# Patient Record
Sex: FEMALE | Race: WHITE | NOT HISPANIC OR LATINO | Employment: OTHER | URBAN - METROPOLITAN AREA
[De-identification: names, ages, dates, MRNs, and addresses within clinical notes are randomized per-mention and may not be internally consistent; named-entity substitution may affect disease eponyms.]

---

## 2023-10-06 DIAGNOSIS — Z96.641 PRESENCE OF ARTIFICIAL HIP JOINT, RIGHT: Primary | ICD-10-CM

## 2023-10-06 PROBLEM — E78.5 HYPERLIPIDEMIA: Status: ACTIVE | Noted: 2023-10-06

## 2023-10-06 PROBLEM — L57.9 SKIN CHANGES DUE TO CHRONIC EXPOSURE TO NONIONIZING RADIATION, UNSPECIFIED: Status: ACTIVE | Noted: 2023-08-11

## 2023-10-06 PROBLEM — I78.1 NEVUS, NON-NEOPLASTIC: Status: ACTIVE | Noted: 2023-08-11

## 2023-10-06 PROBLEM — C88.0 WALDENSTROM MACROGLOBULINEMIA (MULTI): Status: ACTIVE | Noted: 2023-10-06

## 2023-10-06 PROBLEM — G43.909 MIGRAINES: Status: ACTIVE | Noted: 2023-10-06

## 2023-10-06 PROBLEM — B07.8 OTHER VIRAL WARTS: Status: ACTIVE | Noted: 2023-08-11

## 2023-10-06 PROBLEM — C85.10 B-CELL LYMPHOMA (MULTI): Status: ACTIVE | Noted: 2023-10-06

## 2023-10-06 PROBLEM — N94.9 VAGINAL BURNING: Status: ACTIVE | Noted: 2023-10-06

## 2023-10-06 PROBLEM — L81.4 OTHER MELANIN HYPERPIGMENTATION: Status: ACTIVE | Noted: 2023-08-11

## 2023-10-06 PROBLEM — C88.00 WALDENSTROM MACROGLOBULINEMIA: Status: ACTIVE | Noted: 2023-10-06

## 2023-10-06 PROBLEM — N94.89 VAGINAL BURNING: Status: ACTIVE | Noted: 2023-10-06

## 2023-10-06 PROBLEM — L82.1 OTHER SEBORRHEIC KERATOSIS: Status: ACTIVE | Noted: 2023-08-11

## 2023-10-06 PROBLEM — K13.0 DISEASES OF LIPS: Status: ACTIVE | Noted: 2023-08-11

## 2023-10-06 PROBLEM — D18.01 HEMANGIOMA OF SKIN AND SUBCUTANEOUS TISSUE: Status: ACTIVE | Noted: 2023-08-11

## 2023-10-06 PROBLEM — M67.442 MUCOUS CYST OF DIGIT OF LEFT HAND: Status: ACTIVE | Noted: 2023-10-06

## 2023-10-06 PROBLEM — D22.5 MELANOCYTIC NEVI OF TRUNK: Status: ACTIVE | Noted: 2023-08-11

## 2023-10-06 RX ORDER — NITROFURANTOIN 25; 75 MG/1; MG/1
CAPSULE ORAL
COMMUNITY
Start: 2022-10-29

## 2023-10-06 RX ORDER — DEXAMETHASONE 4 MG/1
TABLET ORAL
COMMUNITY
Start: 2019-01-02

## 2023-10-06 RX ORDER — ACYCLOVIR 400 MG/1
TABLET ORAL
COMMUNITY
Start: 2019-01-02

## 2023-10-06 RX ORDER — FLUOCINONIDE 0.5 MG/G
CREAM TOPICAL
COMMUNITY
Start: 2023-06-01

## 2023-10-06 RX ORDER — KETOCONAZOLE 20 MG/G
1 CREAM TOPICAL
COMMUNITY
Start: 2016-06-22

## 2023-10-06 RX ORDER — SCOLOPAMINE TRANSDERMAL SYSTEM 1 MG/1
PATCH, EXTENDED RELEASE TRANSDERMAL
COMMUNITY
Start: 2018-08-16

## 2023-10-06 RX ORDER — NYSTATIN 100000 [USP'U]/ML
SUSPENSION ORAL
COMMUNITY
Start: 2018-10-31

## 2023-10-06 RX ORDER — ESTRADIOL 0.1 MG/G
CREAM VAGINAL
COMMUNITY
Start: 2023-04-12

## 2023-10-06 RX ORDER — HYDROCORTISONE 25 MG/G
1 OINTMENT TOPICAL
COMMUNITY
Start: 2016-06-22

## 2023-10-06 RX ORDER — NYSTATIN AND TRIAMCINOLONE ACETONIDE 100000; 1 [USP'U]/G; MG/G
1 OINTMENT TOPICAL
COMMUNITY
Start: 2016-06-13

## 2023-10-06 RX ORDER — AZITHROMYCIN 250 MG/1
TABLET, FILM COATED ORAL
COMMUNITY
Start: 2018-08-16

## 2023-10-06 RX ORDER — CLORAZEPATE DIPOTASSIUM 7.5 MG/1
TABLET ORAL
COMMUNITY
Start: 2018-10-29

## 2023-10-06 RX ORDER — ROSUVASTATIN CALCIUM 10 MG/1
TABLET, COATED ORAL
COMMUNITY
Start: 2023-07-27

## 2023-10-06 RX ORDER — ZANUBRUTINIB 80 MG/1
CAPSULE, GELATIN COATED ORAL
COMMUNITY
Start: 2022-12-16

## 2023-10-06 RX ORDER — SUMATRIPTAN SUCCINATE 25 MG/1
TABLET ORAL
COMMUNITY
Start: 2018-10-29

## 2023-10-06 RX ORDER — PREDNISONE 10 MG/1
TABLET ORAL
COMMUNITY
Start: 2018-03-21

## 2023-10-06 RX ORDER — FLUTICASONE PROPIONATE 50 MCG
SPRAY, SUSPENSION (ML) NASAL
COMMUNITY
Start: 2018-06-19

## 2023-10-06 RX ORDER — AZELASTINE 1 MG/ML
SPRAY, METERED NASAL
COMMUNITY

## 2023-10-06 RX ORDER — CEPHALEXIN 500 MG/1
CAPSULE ORAL
COMMUNITY
Start: 2018-12-24

## 2023-10-09 ENCOUNTER — OFFICE VISIT (OUTPATIENT)
Dept: ORTHOPEDIC SURGERY | Facility: CLINIC | Age: 73
End: 2023-10-09
Payer: MEDICARE

## 2023-10-09 ENCOUNTER — ANCILLARY PROCEDURE (OUTPATIENT)
Dept: RADIOLOGY | Facility: CLINIC | Age: 73
End: 2023-10-09
Payer: MEDICARE

## 2023-10-09 VITALS — WEIGHT: 170 LBS | BODY MASS INDEX: 31.28 KG/M2 | HEIGHT: 62 IN

## 2023-10-09 DIAGNOSIS — Z96.641 PRESENCE OF ARTIFICIAL HIP JOINT, RIGHT: ICD-10-CM

## 2023-10-09 DIAGNOSIS — M70.61 TROCHANTERIC BURSITIS OF RIGHT HIP: Primary | ICD-10-CM

## 2023-10-09 PROCEDURE — 99203 OFFICE O/P NEW LOW 30 MIN: CPT | Performed by: ORTHOPAEDIC SURGERY

## 2023-10-09 PROCEDURE — 73502 X-RAY EXAM HIP UNI 2-3 VIEWS: CPT | Mod: RT

## 2023-10-09 PROCEDURE — 73502 X-RAY EXAM HIP UNI 2-3 VIEWS: CPT | Mod: RIGHT SIDE | Performed by: RADIOLOGY

## 2023-10-09 NOTE — PROGRESS NOTES
Subjective    Patient ID: Dominga Waters is a 73 y.o. female.    Chief Complaint: OTHER (F/U RT TERRY/DOS: 5/10)       This is a 73-year-old female who has not been seen in the office in 6 to 7 years and returns today with regard to follow-up of bilateral hip replacements the right having been done 12 years ago on the left 10 years ago.  With regard to both hips she has done extremely well and is dramatically improved compared with the pain and limitations she was experiencing prior to surgery.  On occasion she does note some tightness along the lateral aspect of her right hip.  She has not noted any fevers or chills and she is not experiencing any groin pain on either side.  She has remained ambulatory without use of an assistive device.    This patient's past medical, social, and family history were reviewed as well as a review of systems including updates on the patient's information encounter sheet  Denies history diabetes    Current Outpatient Medications:   ·  acyclovir (Zovirax) 400 mg tablet, Take by mouth., Disp: , Rfl:   ·  azelastine (Astelin) 137 mcg (0.1 %) nasal spray, , Disp: , Rfl:   ·  azithromycin (Zithromax) 250 mg tablet, Take by mouth., Disp: , Rfl:   ·  Brukinsa 80 mg capsule, , Disp: , Rfl:   ·  cephalexin (Keflex) 500 mg capsule, Take by mouth., Disp: , Rfl:   ·  clorazepate (Tranxene) 7.5 mg tablet, Take by mouth., Disp: , Rfl:   ·  dexAMETHasone (Decadron) 4 mg tablet, Take by mouth., Disp: , Rfl:   ·  estradiol (Estrace) 0.01 % (0.1 mg/gram) vaginal cream, Use as directed, Disp: , Rfl:   ·  fluocinonide 0.05 % cream, , Disp: , Rfl:   ·  fluticasone (Flonase) 50 mcg/actuation nasal spray, Administer into affected nostril(s)., Disp: , Rfl:   ·  hydrocortisone 2.5 % ointment, Apply 1 Application topically., Disp: , Rfl:   ·  ketoconazole (NIZOral) 2 % cream, Apply 1 Application topically., Disp: , Rfl:   ·  nitrofurantoin, macrocrystal-monohydrate, (Macrobid) 100 mg capsule, , Disp: ,  Rfl:   ·  nystatin (Mycostatin) 100,000 unit/mL suspension, Take by mouth., Disp: , Rfl:   ·  nystatin-triamcinolone (Mycolog II) ointment, Apply 1 Application topically., Disp: , Rfl:   ·  predniSONE (Deltasone) 10 mg tablet, Take by mouth., Disp: , Rfl:   ·  rosuvastatin (Crestor) 10 mg tablet, , Disp: , Rfl:   ·  scopolamine (Transderm-Scop) 1 mg over 3 days patch 3 day, Place on the skin., Disp: , Rfl:   ·  SUMAtriptan (Imitrex) 25 mg tablet, Take by mouth., Disp: , Rfl:       Physical Examination  Constitutional: Patient's height and weight reviewed, appears well kempt  Psychiatric: Alert and oriented ×3, appropriate mood and behavior  Pulmonary: Breathing appears nonlabored, no apparent distress  Lymphatic: No appreciable lymphadenopathy to both the upper and lower extremities  Skin: No open lesions, rashes, ulcerations  Neurologic: Gross motor and sensory exam appear intact (except for abnormalities noted in the below muscle skeletal exam)    Musculoskeletal: Satisfactory range of motion of each hip without groin or thigh pain elicited.  Ambulates weightbearing as tolerated without a limp without using assistive device.  There is no rotational deformity nor shortening of the lower extremity  There is some mild tenderness along the lateral aspect of the right hip without erythema or warmth.    X-rays performed and reviewed at length by myself demonstrate each hip replacement remains well-positioned without evidence of component loosening.    Assessment: Doing well status post hip replacement with perhaps some mild right hip trochanteric bursitis    Plan: Begin an outpatient physical therapy program for stretching.  May use topical Voltaren gel.  May consider a right hip injection in the future if so needed.            Encounter Diagnoses:  Trochanteric bursitis of right hip    Orders Placed This Encounter    Referral to Physical Therapy

## 2023-10-23 ENCOUNTER — EVALUATION (OUTPATIENT)
Dept: PHYSICAL THERAPY | Facility: CLINIC | Age: 73
End: 2023-10-23
Payer: MEDICARE

## 2023-10-23 DIAGNOSIS — M70.61 TROCHANTERIC BURSITIS OF RIGHT HIP: ICD-10-CM

## 2023-10-23 PROCEDURE — 97161 PT EVAL LOW COMPLEX 20 MIN: CPT | Mod: GP

## 2023-10-23 PROCEDURE — 97110 THERAPEUTIC EXERCISES: CPT | Mod: GP

## 2023-10-23 NOTE — PROGRESS NOTES
Physical Therapy Evaluation    Patient Name Dominga Waters  MRN: [id number]  Today's Date: 10/23/23  Time Calculation  Start Time: 0900  Stop Time: 0940  Time Calculation (min): 40 min    Assessment:  R hip bursitis limiting function with walking  Presents with  RLE weakness/ dec LE flexibility /balance deficits    Impression:  Skilled PT services will aid in advancing functional status and attaining therapy goals.    Problem List:  -activity limitations  -Functional limitations  -Pain R hip  -decreased strength   -decreased flexibility  -decreased knowledge of HEP  -balance    Goals:  STG 2 wks  Compliant with HEP  Dec pain 25%    LTG by discharge  I HEP  Improve functional outcome score to indicate improved functional mobility  Dec pain % on pain scale with ability to walk 30' w/o difficulty  Inc RLE strength with improved walking tolerance  Inc LE flexibility   Improve SLS > 15 sec w/o UE support      Rehab Potential: good    Plan:    Therapeutic exercise, Home program instruction and progression, Neuromuscular re-education, Therapeutic activities, Instruction in activity modification, Vasopneumatic device + cold, and Cryotherapy, balance activities    Nustep for soft tissue warmup, ROM/strengthening LE, LE flexibility, CKC activities, gait/stair training, CP/game ready       1 x week  until goals met or maximum rehab potential met  Pt is currently scheduled for 6 weeks    Plan of care was designed with input and agreement by the patient        Therapy Diagnoses:   1. Trochanteric bursitis of right hip  Referral to Physical Therapy    Follow Up In Physical Therapy          Onset Date: 9/1/23  Referring Physician: Lopresti  PT Orders: evaluate and treat    Insurance: MCR  -authorization required: no  -number of visits authorized: N/A  -Authorization/certification dates: 10/23/23-1/21/24    Next MD appointment: none      Subjective:    Current Episode of  "Functional Impairment and/or Pain :    6 wks ago insidious onset  Hx of B THR 10-12 yrs ago  Dx with bursitis  Feels like the post-op healing  Xrays looked good  Tried Voltaren gel with mild relief    Pain    Pain assessment 0-10  Pain score: 8  Pain location: lateral R hip tightness    Exacerbating Factors: walking  Relieving Factors: rest    Current Medical management:     PMHx: B THR, lymphoma in remission because \"numbers are low\"     Medications for pain: Voltaren gel     Diagnostic Tests: xrays-negative    Functional Limitations:  Functional Limitations: Walking  Limited with walking dog    Home Living Situation:  Lives with spouse  [place]anch  Laundry in basement    Prior Level of Function  Able to walk dog for 30'    Patient Goal For Therapy  Loosen R hip    Occupation: retired    Precautions: no fall risk  Objective:    Ortho:    Hip Eval    Objective:  AROM R hip flexion 117    Strength   LLE 5/5 except L hip flexion 4  RLE  knee ext: 4+          flex: 4+  hip flex: 4-        abd: 3+            flexibility:    hamstrings: WNL  TFL: WNL  Piriformis R end range tightness    SLS w/o UE support R 7 sec   L 15 sec     Special Tests    Hip scouring -  Fabres -    Palpation: POP lateral R hip    Gait: I amb w/o device  Step to pattern since THR          Outcome Measures:  Other Measures  Lower Extremity Funtional Score (LEFS): 41       Treatment:    LTR 10x  R piriformis stretch 10 sec 5x  Adductor sets 10x  Hooklying hip abduction green 10x   Isometric hip flexion 10x    Education: Educated on relevant anatomy and expected plan of care  Instructed in initial HEP including reasoning of given exercises and issued written instructions  V/c's required for correct exercise performance     "

## 2023-10-23 NOTE — LETTER
October 23, 2023     Patient: Dominga Waters   YOB: 1950   Date of Visit: 10/23/2023       To Whom It May Concern:    It is my medical opinion that Dominga Waters {Work release (duty restriction):80372}.    If you have any questions or concerns, please don't hesitate to call.         Sincerely,        Nayana Cotton, PT    CC: No Recipients

## 2023-10-23 NOTE — LETTER
October 23, 2023    Nayana Cotton, PT  [address]   Rehabilitation Services  [facility] [address]    Patient: Dominga Waters   YOB: 1950   Date of Visit: 10/23/2023       Dear Michael A Lopresti, Md  [address] [facility]  [place] [address]    The attached plan of care is being sent to you because your patient’s medical reimbursement requires that you certify the plan of care. Your signature is required to allow uninterrupted insurance coverage.      You may indicate your approval by signing below and faxing this form back to us at Dept Fax: [phone].    Please call Dept: [phone] with any questions or concerns.    Thank you for this referral,        Nayana Cotton, PT  [address] [facility] [address]  [address] [facility] [address]    Payer: Payor: MEDICARE / Plan: MEDICARE PART A AND B / Product Type: *No Product type* /                                                                         Date:     Dear Nayana Cotton PT,     Re: Ms. Dominga Waters, MRN:[id number]    I certify that I have reviewed the attached plan of care and it is medically necessary for Ms. Dominga Waters (1950) who is under my care.          ______________________________________                    _________________  Provider name and credentials                                           Date and time                                                                                           Plan of Care 10/23/23   Effective from: 10/23/2023  Effective to: 1/21/2024    Plan ID: [id number]            Participants as of Finalize on 10/23/2023    Name Type Comments Contact Info    Michael A Lopresti, MD Referring Provider  [phone]    Nayana Cotton PT Physical Therapist  [phone]       Last Plan Note     Author: Nayana Cotton PT Status: Incomplete Last edited: 10/23/2023  9:00 AM                                                        Physical Therapy Evaluation    Patient Name Dominga Waters  MRN: [id number]  Today's Date: 10/23/23  Time Calculation  Start Time: 0900  Stop Time: 0940  Time Calculation (min): 40 min    Assessment:  R hip bursitis limiting function with walking  Presents with  RLE weakness/ dec LE flexibility /balance deficits    Impression:  Skilled PT services will aid in advancing functional status and attaining therapy goals.    Problem List:  -activity limitations  -Functional limitations  -Pain R hip  -decreased strength   -decreased flexibility  -decreased knowledge of HEP  -balance    Goals:  STG 2 wks  Compliant with HEP  Dec pain 25%    LTG by discharge  I HEP  Improve functional outcome score to indicate improved functional mobility  Dec pain % on pain scale with ability to walk 30' w/o difficulty  Inc RLE strength with improved walking tolerance  Inc LE flexibility   Improve SLS > 15 sec w/o UE support      Rehab Potential: good    Plan:    Therapeutic exercise, Home program instruction and progression, Neuromuscular re-education, Therapeutic activities, Instruction in activity modification, Vasopneumatic device + cold, and Cryotherapy, balance activities    Nustep for soft tissue warmup, ROM/strengthening LE, LE flexibility, CKC activities, gait/stair training, CP/game ready       1 x week  until goals met or maximum rehab potential met  Pt is currently scheduled for 6 weeks    Plan of care was designed with input and agreement by the patient        Therapy Diagnoses:   1. Trochanteric bursitis of right hip  Referral to Physical Therapy    Follow Up In Physical Therapy          Onset Date: 9/1/23  Referring Physician: Lopresti  PT Orders: evaluate and treat    Insurance: MCR  -authorization required: no  -number of visits authorized: N/A  -Authorization/certification dates: 10/23/23-1/21/24    Next MD appointment: none      Subjective:    Current Episode of Functional Impairment and/or Pain :    6 wks  "ago insidious onset  Hx of B THR 10-12 yrs ago  Dx with bursitis  Feels like the post-op healing  Xrays looked good  Tried Voltaren gel with mild relief    Pain    Pain assessment 0-10  Pain score: 8  Pain location: lateral R hip tightness    Exacerbating Factors: walking  Relieving Factors: rest    Current Medical management:     PMHx: B THR, lymphoma in remission because \"numbers are low\"     Medications for pain: Voltaren gel     Diagnostic Tests: xrays-negative    Functional Limitations:  Functional Limitations: Walking  Limited with walking dog    Home Living Situation:  Lives with spouse  [place]anch  Laundry in basement    Prior Level of Function  Able to walk dog for 30'    Patient Goal For Therapy  Loosen R hip    Occupation: retired    Precautions: no fall risk  Objective:    Ortho:    Hip Eval    Objective:  AROM R hip flexion 117    Strength   LLE 5/5 except L hip flexion 4  RLE  knee ext: 4+          flex: 4+  hip flex: 4-        abd: 3+            flexibility:    hamstrings: WNL  TFL: WNL  Piriformis R end range tightness    SLS w/o UE support R 7 sec   L 15 sec     Special Tests    Hip scouring -  Fabres -    Palpation: POP lateral R hip    Gait: I amb w/o device  Step to pattern since THR          Outcome Measures:  Other Measures  Lower Extremity Funtional Score (LEFS): 41       Treatment:    LTR 10x  R piriformis stretch 10 sec 5x  Adductor sets 10x  Hooklying hip abduction green 10x   Isometric hip flexion 10x    Education: Educated on relevant anatomy and expected plan of care  Instructed in initial HEP including reasoning of given exercises and issued written instructions  V/c's required for correct exercise performance            Current Participants as of 10/23/2023    Name Type Comments Contact Info    Michael A Lopresti, MD Referring Provider  [phone]    Signature pending    Nayana Cotton PT Physical Therapist  [phone]    Signature pending      "

## 2023-10-23 NOTE — LETTER
October 23, 2023     Patient: Dominga Waters   YOB: 1950   Date of Visit: 10/23/2023       To Whom it May Concern:    Dominga Waters was seen in my clinic on 10/23/2023. She {Return to school/sport:15876}.    If you have any questions or concerns, please don't hesitate to call.         Sincerely,          Nayana Cotton, PT        CC: No Recipients

## 2023-10-30 ENCOUNTER — TREATMENT (OUTPATIENT)
Dept: PHYSICAL THERAPY | Facility: CLINIC | Age: 73
End: 2023-10-30
Payer: MEDICARE

## 2023-10-30 DIAGNOSIS — M70.61 TROCHANTERIC BURSITIS OF RIGHT HIP: ICD-10-CM

## 2023-10-30 PROCEDURE — 97112 NEUROMUSCULAR REEDUCATION: CPT | Mod: GP

## 2023-10-30 PROCEDURE — 97110 THERAPEUTIC EXERCISES: CPT | Mod: GP

## 2023-10-30 NOTE — PROGRESS NOTES
Physical Therapy Treatment Note      Patient Name Dominga Waters  MRN: [id number]  Today's Date: 10/30/23  Time Calculation  Start Time: 0945  Stop Time: 1025  Time Calculation (min): 40 min    Insurance: [facility]  Visit #: 2  Date of Onset:  -authorization required: no  -number of visits authorized N/A  -authorization/ certification dates: 10/23/23-1/21/24    General:  Next MD appt: none    Therapy Diagnoses:   1. Trochanteric bursitis of right hip  Follow Up In Physical Therapy          Assessment:  skilled intervention: exercise progression for balance    Patient would continue to benefit from skilled PT to address remaining functional, objective and subjective deficits to allow them to return to full independence with ADLs   Did well with initial HEP although does require some cueing  Progressed well with CKC/balance activities with no inc sx    Plan:  SLR  Sidelying hip abduction    Precautions: no fall risk    Subjective:  General:  Using Voltaren gel with relief  Did well with HEP although did wake up with some inc stiffness on Saturday that has resolved    Functional Progress:  Walking dog 1/2 block  Reports she misunderstood last visit and she is able to do reciprocal stairs with HR    Pain  Pain assessment 0-10  Pain score: 0  Pain location: tightness only R hip      Compliant with HEP:  yes    Understanding of HEP: needs review    Objective:      Therapeutic Exercise    25 minutes    see below  **indicates new exercises or progression  NP=not performed    Neuromuscular Re-education:    15 minutes    See below  **indicates new exercises or progression  NP=not performed    Therapeutic Activity:      Manual      minutes    Modalities    Electric Stimulation    minutes    Ultrasound    minutes    Vasopneumatic Device    minutes      Gait      minutes    Other     Exercise Log:    Nustep L3 5' **  LTR 10x  [facility]  [place]x **  R piriformis stretch 10 sec 5x  Adductor sets 10x  Hooklying hip abduction green 10x   Hooklying marching green 10x **  Isometric hip flexion 10x  Bridge 10x **  Seated hamstring curl green 10x2 **    SLS 10 sec 5x airex BLE **  Airex calf raises 10x2 **  Side stepping 3x  yellow **  Standing hip abd/ext 2# 10x2 **    Education:    Instructed in progression of exercises and issued written instructions for bridge and marching  Some v/c's required to complete exercises

## 2023-11-07 ENCOUNTER — TREATMENT (OUTPATIENT)
Dept: PHYSICAL THERAPY | Facility: CLINIC | Age: 73
End: 2023-11-07
Payer: MEDICARE

## 2023-11-07 DIAGNOSIS — M70.61 TROCHANTERIC BURSITIS OF RIGHT HIP: ICD-10-CM

## 2023-11-07 PROCEDURE — 97110 THERAPEUTIC EXERCISES: CPT | Mod: GP,CQ

## 2023-11-07 PROCEDURE — 97112 NEUROMUSCULAR REEDUCATION: CPT | Mod: GP,CQ

## 2023-11-07 NOTE — PROGRESS NOTES
Physical Therapy Treatment Note      Patient Name Dominga Waters  MRN: [id number]  Today's Date: 11/07/23    Time Calculation  Start Time: 0830  Stop Time: 0915  Time Calculation (min): 45 min    Insurance: [facility]  Visit #: 3  Date of Onset: 9/1/23  -authorization required: no  -number of visits authorized N/A  -authorization/ certification dates: 10/23/23-1/21/24    General:  Next MD appt: none    Therapy Diagnoses:   1. Trochanteric bursitis of right hip  Follow Up In Physical Therapy          Assessment:  Patient presented to session with less soft tissue tightness in the R hip than previous sessions. Able to add SLR and side lying hip abduction with tactile cues given for pelvic stabilization. Challenged with balance strategies using unilateral support during SLS on Air Ex. No c/o increased pain post treatment. Updated HEP.    Plan:  Add side lying clamshells and resisted monster walk.    Precautions: no fall risk    Subjective:  Patient states her R hip does not feel as tight as it used to; says she uses Voltaren 2x a day.    Functional Progress:  Walking her dog and doing steps without difficulty    Pain  Pain assessment 0-10  Pain score: 0  Pain location: tightness only R hip      Compliant with HEP:  yes    Understanding of HEP: needs review    Objective:      Therapeutic Exercise    30 minutes    see below  **indicates new exercises or progression  NP=not performed    Neuromuscular Re-education:    15 minutes    See below  **indicates new exercises or progression  NP=not performed    Therapeutic Activity:      Manual      minutes    Modalities    Electric Stimulation    minutes    Ultrasound    minutes    Vasopneumatic Device    minutes      Gait      minutes    Other     Exercise Log:    Nustep L3 x 5'  LTR 10x  SKC 10x   R piriformis stretch 10 sec 5x  Adductor sets 10x  Hooklying hip abduction green 10x    Hooklying marching green 10x   Isometric hip flexion 10x  Bridge 10x   Side lying hip abd x 10 **  SLR x 10 **  Seated hamstring curl green 10x2     SLS 10 sec 5x airex BLE unilateral support  Airex calf raises 10x2   Side stepping 3x  yellow   Standing hip abd/ext 2# 10x2     Education:  Exercise technique, postural awareness, exercise progression

## 2023-11-14 ENCOUNTER — TREATMENT (OUTPATIENT)
Dept: PHYSICAL THERAPY | Facility: CLINIC | Age: 73
End: 2023-11-14
Payer: MEDICARE

## 2023-11-14 DIAGNOSIS — M70.61 TROCHANTERIC BURSITIS OF RIGHT HIP: ICD-10-CM

## 2023-11-14 PROCEDURE — 97112 NEUROMUSCULAR REEDUCATION: CPT | Mod: GP,CQ

## 2023-11-14 PROCEDURE — 97110 THERAPEUTIC EXERCISES: CPT | Mod: GP,CQ

## 2023-11-14 NOTE — PROGRESS NOTES
Physical Therapy Treatment Note      Patient Name Dominga Waters  MRN: [id number]  Today's Date: 11/14/23    Time Calculation  Start Time: 0915  Stop Time: 1000  Time Calculation (min): 45 min    Insurance: [facility]  Visit #: 4  Date of Onset: 9/1/23  -authorization required: no  -number of visits authorized N/A  -authorization/ certification dates: 10/23/23-1/21/24    General:  Next MD appt: none    Therapy Diagnoses:   1. Trochanteric bursitis of right hip  Follow Up In Physical Therapy          Assessment:  Patient continues to have decreasing tightness of the R hip since beginning PT. Able to increase repetitions for resisted hip abduction and marching in supine without difficulty. Progressed with resisted monster walks forward and retro with verbal cues for proper stepping technique. Tactile cues given for pelvic stability during side lying clamshells and side lying hip abduction. No c/o increased pain post session.    Plan:  Increase ankle weight for standing hip abd/ext and add shuttle leg press NV    Precautions: no fall risk    Subjective:  Patient states the R hip does not feel as tight as in the beginning of PT.    Functional Progress:  Less pain and tightness with ADLs and iADLs    Pain  Pain assessment 0-10  Pain score: 0  Pain location: tightness only R hip      Compliant with HEP:  yes    Understanding of HEP: needs review    Objective:      Therapeutic Exercise    30 minutes    see below  **indicates new exercises or progression  NP=not performed    Neuromuscular Re-education:    15 minutes    See below  **indicates new exercises or progression  NP=not performed    Therapeutic Activity:      Manual      minutes    Modalities    Electric Stimulation    minutes    Ultrasound    minutes    Vasopneumatic Device    minutes      Gait      minutes    Other     Exercise Log:    Nustep L3 x 5'  LTR 10x  [facility] 10x    R piriformis stretch 10 sec 5x  Adductor sets 10x  Hooklying hip abduction green 10x2**  Hooklying marching green 10x2**   Isometric hip flexion 10x  Bridge 10x   Side lying hip abd x 10   Side lying clamshells x 10 **  SLR x 10   Seated hamstring curl green 10x2     SLS 10 sec 5x airex BLE unilateral support  Airex calf raises 10x2   Side stepping 3x  yellow   Monster walk fwd/retro 2x yellow **  Standing hip abd/ext 2# 10x2     Education:  Exercise technique, postural awareness, exercise progression

## 2023-11-21 ENCOUNTER — TREATMENT (OUTPATIENT)
Dept: PHYSICAL THERAPY | Facility: CLINIC | Age: 73
End: 2023-11-21
Payer: MEDICARE

## 2023-11-21 DIAGNOSIS — M70.61 TROCHANTERIC BURSITIS OF RIGHT HIP: ICD-10-CM

## 2023-11-21 PROCEDURE — 97112 NEUROMUSCULAR REEDUCATION: CPT | Mod: GP

## 2023-11-21 PROCEDURE — 97110 THERAPEUTIC EXERCISES: CPT | Mod: GP

## 2023-11-21 NOTE — PROGRESS NOTES
Physical Therapy Treatment Note      Patient Name Dominga Waters  MRN: [id number]  Today's Date: 11/21/23    Time Calculation  Start Time: 0910  Stop Time: 0950  Time Calculation (min): 40 min    Insurance: Payor: MEDICARE / Plan: MEDICARE PART A AND B / Product Type: *No Product type* /   Visit #: 5  Date of Onset: 9/1/23  -authorization required: no  -number of visits authorized N/A  -authorization/ certification dates: 10/23/23-1/21/24    General:  Next MD appt: none    Therapy Diagnoses:   1. Trochanteric bursitis of right hip  Follow Up In Physical Therapy    Follow Up In Physical Therapy          Assessment:  skilled intervention: exercise progression for strength, education for exercise performance    Patient would continue to benefit from skilled PT to address remaining functional, objective and subjective deficits to allow them to return to full independence with ADLs   Tolerated progression of resistance well    STG's met    Progressing well with dec pain and good progression with resistive exercises however could benefit from 2 additional visits to further inc strength for improved function  Good core stabilization noted with bridge     Plan:  Add band to clamshells  Airex balance beam  Add 2 visits to current schedule    Precautions: no fall risk    Subjective:  General:  Reporting tightness  40-50% dec pain    Functional Progress:  Back pain with putting up louie decorations over the weekend where she did a lot of bending  Walking tolerance improving    Pain  Pain assessment 0-10  Pain score: 3  Pain location: R hip    Compliant with HEP:  yes    Understanding of HEP: WNL    Objective:  Therapeutic Exercise    25 minutes  see below  **indicates new exercises or progression  NP=not performed    Neuromuscular Re-education:    15 minutes  See below  **indicates new exercises or progression  NP=not performed    Manual       minutes    Modalities    Electric Stimulation    minutes    Ultrasound    minutes    Other   No POP at lateral R hip    Exercise Log:  Nustep L3 x 5'  LTR 10x  SKC 10x   R piriformis stretch 10 sec 5x  Adductor sets 10x  Hooklying hip abduction green 10x2  Hooklying marching green 10x2  Isometric hip flexion 10x  Bridge 10x   Side lying hip abd  10x2 1# **  Side lying clamshells 10x2  SLR 10x2   1# **  Seated hamstring curl green 10x2      SLS 10 sec 5x airex BLE unilateral support  Airex calf raises 10x2   Side stepping 3x  yellow   Monster walk fwd/retro 2x yellow   Standing hip abd/ext 3# 10x2 **  Shuttle 25# 10x2 **    Education:  Instructed in progression of exercises  V/c's for correct exercise performance

## 2023-11-27 ENCOUNTER — APPOINTMENT (OUTPATIENT)
Dept: PHYSICAL THERAPY | Facility: CLINIC | Age: 73
End: 2023-11-27
Payer: MEDICARE

## 2023-11-29 ENCOUNTER — TREATMENT (OUTPATIENT)
Dept: PHYSICAL THERAPY | Facility: CLINIC | Age: 73
End: 2023-11-29
Payer: MEDICARE

## 2023-11-29 DIAGNOSIS — M70.61 TROCHANTERIC BURSITIS OF RIGHT HIP: ICD-10-CM

## 2023-11-29 PROCEDURE — 97110 THERAPEUTIC EXERCISES: CPT | Mod: GP

## 2023-11-29 PROCEDURE — 97112 NEUROMUSCULAR REEDUCATION: CPT | Mod: GP

## 2023-11-29 NOTE — PROGRESS NOTES
Physical Therapy Treatment Note      Patient Name Dominga Waters  MRN: [id number]  Today's Date: 11/29/23    Time Calculation  Start Time: 0945  Stop Time: 1025  Time Calculation (min): 40 min    Insurance: Payor: MEDICARE / Plan: MEDICARE PART A AND B / Product Type: *No Product type* /   Visit #: 6  Date of Onset: 9/1/23  -authorization required: no  -number of visits authorized N/A  -authorization/ certification dates:10/23/23-1/21/24     General:  Next MD appt: none    Therapy Diagnoses:   1. Trochanteric bursitis of right hip  Follow Up In Physical Therapy          Assessment:  skilled intervention:  Exercise progression for strength    Patient would continue to benefit from skilled PT to address remaining functional, objective and subjective deficits to allow them to return to full independence with ADLs     Good tolerance for progression of strengthening  Minimal c/o pain in the last week    Plan:  Inc shuttle  Inc monster walks/side stepping    Precautions: no fall risk    Subjective:  General:  Arrives with no pain  Intermittent tightness only  Feeling stronger    Functional Progress:  Cont to improve with walking tolerance    Pain  Pain assessment 0-10  Pain score: 0  Pain location: R hip    Compliant with HEP:  yes    Understanding of HEP: WNL    Objective:  Therapeutic Exercise    25 minutes  see below  **indicates new exercises or progression  NP=not performed    Neuromuscular Re-education:    15 minutes  See below  **indicates new exercises or progression  NP=not performed      Manual      minutes    Modalities    Electric Stimulation    minutes    Ultrasound    minutes    Vasopneumatic Device    minutes      Gait      minutes    Other   AROM R hip flexion 120    Exercise Log:  Nustep L3 x 5'  LTR 10x  SKC 10x   R piriformis stretch 10 sec 5x  Adductor sets 10x  Hooklying hip abduction green 10x2  Hooklying marching green  10x2  Isometric hip flexion 10x  Bridge 10x   Side lying hip abd  10x2 1#   Side lying clamshells 10x2 green **  SLR 10x2   1#   Seated hamstring curl green 10x2   LAQ 2# 10X2 **     SLS 10 sec 5x airex BLE unilateral support  Airex calf raises 10x2   Side stepping 3x  yellow   Monster walk fwd/retro 2x yellow   Standing hip abd/ext 3# 10x2   Shuttle 25# 10x2   Airex balance beam side stepping 5x **    Education:  Instructed in progression of exercises  V/c's for exercise performance

## 2023-12-07 ENCOUNTER — TREATMENT (OUTPATIENT)
Dept: PHYSICAL THERAPY | Facility: CLINIC | Age: 73
End: 2023-12-07
Payer: MEDICARE

## 2023-12-07 DIAGNOSIS — M70.61 TROCHANTERIC BURSITIS OF RIGHT HIP: ICD-10-CM

## 2023-12-07 PROCEDURE — 97112 NEUROMUSCULAR REEDUCATION: CPT | Mod: GP

## 2023-12-07 PROCEDURE — 97110 THERAPEUTIC EXERCISES: CPT | Mod: GP

## 2023-12-07 NOTE — PROGRESS NOTES
Physical Therapy Treatment Note      Patient Name Dominga Waters  MRN: [id number]  Today's Date: 12/07/23  Time Calculation  Start Time: 1040  Stop Time: 1120  Time Calculation (min): 40 min    Insurance: Payor: MEDICARE / Plan: MEDICARE PART A AND B / Product Type: *No Product type* /   Visit #: 7  Date of Onset:  9/1/23   -authorization required: no  -number of visits authorized N/A  -authorization/ certification dates:10/23/23-1/21/24      General:  Next MD appt: none    Therapy Diagnoses:   1. Trochanteric bursitis of right hip  Follow Up In Physical Therapy          Assessment:  skilled intervention: exercise progression for strength    Patient would continue to benefit from skilled PT to address remaining functional, objective and subjective deficits to allow them to return to full independence with ADLs     Progressed with resistance without difficulty and maintaining low to no pain level    Plan:  1 more visit then discharge to HEP    Precautions: no   fall risk    Subjective:  General:  Hardly noticing pain currently    Functional Progress:  No falls  Tolerating walking well- can walk 30' outside    Pain  Pain assessment 0-10  Pain score: 0  Pain location: R hip    Compliant with HEP:  yes    Understanding of HEP: WNL    Objective:  Therapeutic Exercise  25 minutes  see below  **indicates new exercises or progression  NP=not performed    Neuromuscular Re-education:  15 minutes  See below  **indicates new exercises or progression  NP=not performed    Other     Exercise Log:  Nustep L3 x 5'  LTR 10x  SKC 10x   R piriformis stretch 10 sec 5x  Adductor sets 10x  Hooklying hip abduction green 10x2  Hooklying marching green 10x2  Isometric hip flexion 10x  Bridge 10x   Side lying hip abd  10x2 1#   Side lying clamshells 10x2 green   SLR 10x2   1#   Seated hamstring curl green 10x2   LAQ 2# 10X2      SLS 10 sec 5x airex BLE unilateral  support  Airex calf raises 10x2   Side stepping 3x  red **  Monster walk fwd/retro 2x red **  Standing hip abd/ext 3# 10x2   Shuttle 31# 10x2 **  Shuttle RLE 25# 10x2 **  Airex balance beam side stepping 5x      Education:  Instructed in progression of exercises

## 2023-12-13 ENCOUNTER — TREATMENT (OUTPATIENT)
Dept: PHYSICAL THERAPY | Facility: CLINIC | Age: 73
End: 2023-12-13
Payer: MEDICARE

## 2023-12-13 DIAGNOSIS — M70.61 TROCHANTERIC BURSITIS OF RIGHT HIP: Primary | ICD-10-CM

## 2023-12-13 PROCEDURE — 97110 THERAPEUTIC EXERCISES: CPT | Mod: GP

## 2023-12-13 PROCEDURE — 97112 NEUROMUSCULAR REEDUCATION: CPT | Mod: GP

## 2023-12-13 NOTE — PROGRESS NOTES
Physical Therapy Treatment Note      Patient Name Dominga Waters  MRN: [id number]  Today's Date: 12/13/23  Time Calculation  Start Time: 0815  Stop Time: 0855  Time Calculation (min): 40 min    Insurance: Payor: MEDICARE / Plan: MEDICARE PART A AND B / Product Type: *No Product type* /   Visit #: 8  Date of Onset:9/1/23    -authorization required: no  -number of visits authorized N/A  -authorization/ certification dates:10/23/23-1/21/24     General:  Next MD appt: none    Therapy Diagnoses:   1. Trochanteric bursitis of right hip  Follow Up In Physical Therapy          Assessment:  skilled intervention: reasmt, education for HEP    LTG   I HEP (met)  Improve functional outcome score to indicate improved functional mobility (met)  Dec pain % on pain scale with ability to walk 30' w/o difficulty (met)  Inc RLE strength with improved walking tolerance (met)  Inc LE flexibility (met)  Improve SLS > 15 sec w/o UE support (met)    Plan:  Discharge to [facility] for report period 10/23/23 -12/13/23    Precautions: no fall risk    Subjective:  General:  Arrives with no pain    Functional Progress:  No falls   Reports improved walking tolerance    Pain  Pain assessment 0-10  Pain score:  Pain location: R hip    Compliant with HEP:  yes    Understanding of HEP: WNL    Objective:  Therapeutic Exercise  25 minutes  see below  **indicates new exercises or progression  NP=not performed    Neuromuscular Re-education:  15 minutes  See below  **indicates new exercises or progression  NP=not performed    Other   RLE  knee ext: 5          flex: 5  hip flex: 4+        abd: 4          flexibility:  hamstrings: WNL  TFL: WNL  Piriformis R WNL     SLS w/o UE support R 15 sec   L 15 sec     Exercise Log:  Nustep L3 x 5'  LTR 10x  SKC 10x   R piriformis stretch 10 sec 5x  Adductor sets 10x  Hooklying hip abduction green 10x2  Hooklying marching green 10x2  Isometric hip  flexion 10x  Bridge 10x   Side lying hip abd  10x2 1#   Side lying clamshells 10x2 green   SLR 10x2   1#   Seated hamstring curl green 10x2   LAQ 2# 10X2      SLS 10 sec 5x airex BLE unilateral support  Airex calf raises 10x2   Side stepping 3x  red   Monster walk fwd/retro 2x red   Standing hip abd/ext 3# 10x2   Shuttle 31# 10x2   Shuttle RLE 25# 10x2   Airex balance beam side stepping 5x      Education:  Review of HEP and progression    Outcome Measures:  Other Measures  Lower Extremity Funtional Score (LEFS): 60

## 2024-04-17 DIAGNOSIS — M25.561 ACUTE PAIN OF RIGHT KNEE: ICD-10-CM

## 2024-04-18 ENCOUNTER — OFFICE VISIT (OUTPATIENT)
Dept: ORTHOPEDIC SURGERY | Facility: CLINIC | Age: 74
End: 2024-04-18
Payer: MEDICARE

## 2024-04-18 ENCOUNTER — HOSPITAL ENCOUNTER (OUTPATIENT)
Dept: RADIOLOGY | Facility: CLINIC | Age: 74
Discharge: HOME | End: 2024-04-18
Payer: MEDICARE

## 2024-04-18 VITALS — BODY MASS INDEX: 30.12 KG/M2 | WEIGHT: 170 LBS | HEIGHT: 63 IN

## 2024-04-18 DIAGNOSIS — G89.29 CHRONIC PAIN OF RIGHT KNEE: ICD-10-CM

## 2024-04-18 DIAGNOSIS — M17.11 ARTHRITIS OF RIGHT KNEE: Primary | ICD-10-CM

## 2024-04-18 DIAGNOSIS — M25.561 ACUTE PAIN OF RIGHT KNEE: ICD-10-CM

## 2024-04-18 DIAGNOSIS — M25.561 CHRONIC PAIN OF RIGHT KNEE: ICD-10-CM

## 2024-04-18 PROCEDURE — 1036F TOBACCO NON-USER: CPT | Performed by: ORTHOPAEDIC SURGERY

## 2024-04-18 PROCEDURE — 73562 X-RAY EXAM OF KNEE 3: CPT | Mod: RT

## 2024-04-18 PROCEDURE — 20610 DRAIN/INJ JOINT/BURSA W/O US: CPT | Performed by: ORTHOPAEDIC SURGERY

## 2024-04-18 PROCEDURE — 99214 OFFICE O/P EST MOD 30 MIN: CPT | Performed by: ORTHOPAEDIC SURGERY

## 2024-04-18 PROCEDURE — 1159F MED LIST DOCD IN RCRD: CPT | Performed by: ORTHOPAEDIC SURGERY

## 2024-04-18 PROCEDURE — 1160F RVW MEDS BY RX/DR IN RCRD: CPT | Performed by: ORTHOPAEDIC SURGERY

## 2024-04-18 RX ORDER — CETIRIZINE HYDROCHLORIDE 10 MG/1
10 TABLET ORAL
COMMUNITY

## 2024-04-18 RX ORDER — TRIAMCINOLONE ACETONIDE 40 MG/ML
40 INJECTION, SUSPENSION INTRA-ARTICULAR; INTRAMUSCULAR
Status: COMPLETED | OUTPATIENT
Start: 2024-04-18 | End: 2024-04-18

## 2024-04-18 RX ORDER — LIDOCAINE HYDROCHLORIDE 10 MG/ML
2 INJECTION INFILTRATION; PERINEURAL
Status: COMPLETED | OUTPATIENT
Start: 2024-04-18 | End: 2024-04-18

## 2024-04-18 RX ADMIN — TRIAMCINOLONE ACETONIDE 40 MG: 40 INJECTION, SUSPENSION INTRA-ARTICULAR; INTRAMUSCULAR at 09:51

## 2024-04-18 RX ADMIN — LIDOCAINE HYDROCHLORIDE 2 ML: 10 INJECTION INFILTRATION; PERINEURAL at 09:51

## 2024-04-18 ASSESSMENT — ENCOUNTER SYMPTOMS: KNEE SWELLING: 1

## 2024-04-18 NOTE — PROGRESS NOTES
Subjective    Patient ID: Dominga Waters is a 74 y.o. female.    Chief Complaint: Follow-up of the Right Knee       This is a 74-year-old female who presents for evaluation of right knee pain which she states has been ongoing for few years.  There is no specific injury.  She has noted occasional cracking and popping sensation.  Discomfort with standing and walking.  Has tried rest, modifications of activities as well as ibuprofen with limited benefit.  Majority the pain she describes along the anterior and medial aspect of the knee.  Has had previous right hip replacement done many years ago for which she has done well.  Also has some degree of chronic low back pain.    This patient's past medical, social, and family history were reviewed as well as a review of systems including updates on the patient's information encounter sheet  Patient is not diabetic    Physical Examination  Constitutional: Patient's height and weight reviewed, appears well kempt  Psychiatric: Alert and oriented ×3, appropriate mood and behavior  Pulmonary: Breathing appears nonlabored, no apparent distress  Lymphatic: No appreciable lymphadenopathy to both the upper and lower extremities  Skin: No open lesions, rashes, ulcerations  Neurologic: Gross motor and sensory exam appear intact (except for abnormalities noted in the below muscle skeletal exam)    Musculoskeletal: Satisfactory range of motion of the right hip without groin or thigh pain elicited.  The right knee reveals a small effusion.  Full active extension.  Flexion limited to 115 degrees.  Patellofemoral crepitus with range of motion.  Localized medial joint line tenderness.  Increasing pain with Dionicio examination.  No gross ligamentous instability.    X-rays performed today reviewed at length with the patient demonstrate evidence of just mild arthritic changes involving the patellofemoral joint as well as the medial joint space is visualized on the tunnel  view.    Assessment: Mild arthritis right knee    Plan: An extended discussion ensued with the patient regarding the treatment options for their knee condition. This included both nonoperative and operative treatment options.. The patient will continue with modifications of activities of daily living as well as an exercise program. As the patient desired an intra-articular knee injection of Kenalog/lidocaine was performed today and tolerated well.. The patient will observe to see if the injection is of benefit. Follow-up on a when necessary basis.    Right Knee     L Inj/Asp: R knee on 4/18/2024 9:51 AM  Indications: pain  Details: 22 G needle, anterolateral approach  Medications: 40 mg triamcinolone acetonide 40 mg/mL; 2 mL lidocaine 10 mg/mL (1 %)  Consent was given by the patient. Patient was prepped and draped in the usual sterile fashion.               Current Outpatient Medications:     Brukinsa 80 mg capsule, , Disp: , Rfl:     rosuvastatin (Crestor) 10 mg tablet, , Disp: , Rfl:     acyclovir (Zovirax) 400 mg tablet, Take by mouth., Disp: , Rfl:     azelastine (Astelin) 137 mcg (0.1 %) nasal spray, , Disp: , Rfl:     azithromycin (Zithromax) 250 mg tablet, Take by mouth., Disp: , Rfl:     cephalexin (Keflex) 500 mg capsule, Take by mouth., Disp: , Rfl:     cetirizine (ZyrTEC) 10 mg tablet, Take 1 tablet (10 mg) by mouth., Disp: , Rfl:     clorazepate (Tranxene) 7.5 mg tablet, Take by mouth., Disp: , Rfl:     dexAMETHasone (Decadron) 4 mg tablet, Take by mouth., Disp: , Rfl:     estradiol (Estrace) 0.01 % (0.1 mg/gram) vaginal cream, Use as directed, Disp: , Rfl:     fluocinonide 0.05 % cream, , Disp: , Rfl:     fluticasone (Flonase) 50 mcg/actuation nasal spray, Administer into affected nostril(s)., Disp: , Rfl:     hydrocortisone 2.5 % ointment, Apply 1 Application topically., Disp: , Rfl:     ketoconazole (NIZOral) 2 % cream, Apply 1 Application topically., Disp: , Rfl:     nitrofurantoin,  macrocrystal-monohydrate, (Macrobid) 100 mg capsule, , Disp: , Rfl:     nystatin (Mycostatin) 100,000 unit/mL suspension, Take by mouth., Disp: , Rfl:     nystatin-triamcinolone (Mycolog II) ointment, Apply 1 Application topically., Disp: , Rfl:     predniSONE (Deltasone) 10 mg tablet, Take by mouth., Disp: , Rfl:     scopolamine (Transderm-Scop) 1 mg over 3 days patch 3 day, Place on the skin., Disp: , Rfl:     SUMAtriptan (Imitrex) 25 mg tablet, Take by mouth., Disp: , Rfl:

## 2024-08-09 ENCOUNTER — APPOINTMENT (OUTPATIENT)
Dept: DERMATOLOGY | Facility: CLINIC | Age: 74
End: 2024-08-09
Payer: MEDICARE

## 2024-08-09 DIAGNOSIS — L82.1 SEBORRHEIC KERATOSIS: ICD-10-CM

## 2024-08-09 DIAGNOSIS — D22.9 MULTIPLE BENIGN NEVI: Primary | ICD-10-CM

## 2024-08-09 PROCEDURE — 99213 OFFICE O/P EST LOW 20 MIN: CPT | Performed by: STUDENT IN AN ORGANIZED HEALTH CARE EDUCATION/TRAINING PROGRAM

## 2024-08-09 PROCEDURE — 1159F MED LIST DOCD IN RCRD: CPT | Performed by: STUDENT IN AN ORGANIZED HEALTH CARE EDUCATION/TRAINING PROGRAM

## 2024-08-09 ASSESSMENT — DERMATOLOGY QUALITY OF LIFE (QOL) ASSESSMENT
ARE THERE EXCLUSIONS OR EXCEPTIONS FOR THE QUALITY OF LIFE ASSESSMENT: NO
RATE HOW EMOTIONALLY BOTHERED YOU ARE BY YOUR SKIN PROBLEM (FOR EXAMPLE, WORRY, EMBARRASSMENT, FRUSTRATION): 0 - NEVER BOTHERED
RATE HOW BOTHERED YOU ARE BY SYMPTOMS OF YOUR SKIN PROBLEM (EG, ITCHING, STINGING BURNING, HURTING OR SKIN IRRITATION): 0 - NEVER BOTHERED
RATE HOW BOTHERED YOU ARE BY EFFECTS OF YOUR SKIN PROBLEMS ON YOUR ACTIVITIES (EG, GOING OUT, ACCOMPLISHING WHAT YOU WANT, WORK ACTIVITIES OR YOUR RELATIONSHIPS WITH OTHERS): 0 - NEVER BOTHERED
DATE THE QUALITY-OF-LIFE ASSESSMENT WAS COMPLETED: 67061

## 2024-08-09 ASSESSMENT — DERMATOLOGY PATIENT ASSESSMENT: DO YOU HAVE ANY NEW OR CHANGING LESIONS: YES

## 2024-08-09 ASSESSMENT — ITCH NUMERIC RATING SCALE: HOW SEVERE IS YOUR ITCHING?: 0

## 2024-08-09 ASSESSMENT — PATIENT GLOBAL ASSESSMENT (PGA): PATIENT GLOBAL ASSESSMENT: PATIENT GLOBAL ASSESSMENT:  1 - CLEAR

## 2024-08-09 NOTE — PROGRESS NOTES
Subjective     Dominga Waters is a 74 y.o. female who presents for the following: Skin Check (Pt concerned about spots on her Chest and Left Arm.).     Review of Systems:  No other skin or systemic complaints other than what is documented elsewhere in the note.    The following portions of the chart were reviewed this encounter and updated as appropriate:         Skin Cancer History  No skin cancer on file.      Specialty Problems          Dermatology Problems    Hemangioma of skin and subcutaneous tissue    Melanocytic nevi of trunk    Nevus, non-neoplastic    Other melanin hyperpigmentation    Other seborrheic keratosis    Other viral warts    Skin changes due to chronic exposure to nonionizing radiation, unspecified        Objective   Well appearing patient in no apparent distress; mood and affect are within normal limits.    A full examination was performed including scalp, head, eyes, ears, nose, lips, neck, chest, axillae, abdomen, back, buttocks, bilateral upper extremities, bilateral lower extremities, hands, feet, fingers, toes, fingernails, and toenails. All findings within normal limits unless otherwise noted below.    Assessment/Plan   1. Multiple benign nevi    Exam findings: Benign appearing macules and papules  Plan: monitor for any new or changing nevi. Notify me should this occur.  Over the counter use of sun screen product (30+ SPF with mineral sun screen) recommended      2. Seborrheic keratosis    Benign appearing seborrheic papules on trunk/extremities  Reassured, benign'

## 2024-08-13 ENCOUNTER — HOSPITAL ENCOUNTER (OUTPATIENT)
Dept: RADIOLOGY | Facility: CLINIC | Age: 74
Discharge: HOME | End: 2024-08-13
Payer: MEDICARE

## 2024-08-13 VITALS — WEIGHT: 169.97 LBS | HEIGHT: 63 IN | BODY MASS INDEX: 30.12 KG/M2

## 2024-08-13 DIAGNOSIS — Z12.31 ENCOUNTER FOR SCREENING MAMMOGRAM FOR MALIGNANT NEOPLASM OF BREAST: ICD-10-CM

## 2024-08-13 PROCEDURE — 77067 SCR MAMMO BI INCL CAD: CPT | Performed by: RADIOLOGY

## 2024-08-13 PROCEDURE — 77067 SCR MAMMO BI INCL CAD: CPT

## 2024-08-13 PROCEDURE — 77063 BREAST TOMOSYNTHESIS BI: CPT | Performed by: RADIOLOGY

## 2025-08-11 ENCOUNTER — APPOINTMENT (OUTPATIENT)
Dept: DERMATOLOGY | Facility: CLINIC | Age: 75
End: 2025-08-11
Payer: MEDICARE

## 2025-08-11 ASSESSMENT — DERMATOLOGY QUALITY OF LIFE (QOL) ASSESSMENT
RATE HOW BOTHERED YOU ARE BY EFFECTS OF YOUR SKIN PROBLEMS ON YOUR ACTIVITIES (EG, GOING OUT, ACCOMPLISHING WHAT YOU WANT, WORK ACTIVITIES OR YOUR RELATIONSHIPS WITH OTHERS): 0 - NEVER BOTHERED
RATE HOW BOTHERED YOU ARE BY EFFECTS OF YOUR SKIN PROBLEMS ON YOUR ACTIVITIES (EG, GOING OUT, ACCOMPLISHING WHAT YOU WANT, WORK ACTIVITIES OR YOUR RELATIONSHIPS WITH OTHERS): 0 - NEVER BOTHERED
WHAT SINGLE SKIN CONDITION LISTED BELOW IS THE PATIENT ANSWERING THE QUALITY-OF-LIFE ASSESSMENT QUESTIONS ABOUT: NONE OF THE ABOVE
RATE HOW EMOTIONALLY BOTHERED YOU ARE BY YOUR SKIN PROBLEM (FOR EXAMPLE, WORRY, EMBARRASSMENT, FRUSTRATION): 0 - NEVER BOTHERED
ARE THERE EXCLUSIONS OR EXCEPTIONS FOR THE QUALITY OF LIFE ASSESSMENT: NO
WHAT SINGLE SKIN CONDITION LISTED BELOW IS THE PATIENT ANSWERING THE QUALITY-OF-LIFE ASSESSMENT QUESTIONS ABOUT: NONE OF THE ABOVE
RATE HOW BOTHERED YOU ARE BY SYMPTOMS OF YOUR SKIN PROBLEM (EG, ITCHING, STINGING BURNING, HURTING OR SKIN IRRITATION): 0 - NEVER BOTHERED
RATE HOW EMOTIONALLY BOTHERED YOU ARE BY YOUR SKIN PROBLEM (FOR EXAMPLE, WORRY, EMBARRASSMENT, FRUSTRATION): 0 - NEVER BOTHERED
DATE THE QUALITY-OF-LIFE ASSESSMENT WAS COMPLETED: 67428
RATE HOW BOTHERED YOU ARE BY SYMPTOMS OF YOUR SKIN PROBLEM (EG, ITCHING, STINGING BURNING, HURTING OR SKIN IRRITATION): 0 - NEVER BOTHERED

## 2025-08-11 ASSESSMENT — PATIENT GLOBAL ASSESSMENT (PGA): PATIENT GLOBAL ASSESSMENT: PATIENT GLOBAL ASSESSMENT:  1 - CLEAR

## 2025-08-18 ENCOUNTER — APPOINTMENT (OUTPATIENT)
Dept: DERMATOLOGY | Facility: CLINIC | Age: 75
End: 2025-08-18
Payer: MEDICARE

## 2025-08-18 DIAGNOSIS — L82.1 SEBORRHEIC KERATOSIS: ICD-10-CM

## 2025-08-18 DIAGNOSIS — D22.9 MULTIPLE BENIGN NEVI: Primary | ICD-10-CM

## 2025-08-18 PROCEDURE — 99213 OFFICE O/P EST LOW 20 MIN: CPT | Performed by: STUDENT IN AN ORGANIZED HEALTH CARE EDUCATION/TRAINING PROGRAM

## 2025-08-18 PROCEDURE — 1159F MED LIST DOCD IN RCRD: CPT | Performed by: STUDENT IN AN ORGANIZED HEALTH CARE EDUCATION/TRAINING PROGRAM

## 2025-08-19 ENCOUNTER — HOSPITAL ENCOUNTER (OUTPATIENT)
Dept: RADIOLOGY | Facility: CLINIC | Age: 75
Discharge: HOME | End: 2025-08-19
Payer: MEDICARE

## 2025-08-19 DIAGNOSIS — Z12.31 ENCOUNTER FOR SCREENING MAMMOGRAM FOR MALIGNANT NEOPLASM OF BREAST: ICD-10-CM

## 2025-08-19 PROCEDURE — 77067 SCR MAMMO BI INCL CAD: CPT

## 2025-08-19 PROCEDURE — 77067 SCR MAMMO BI INCL CAD: CPT | Performed by: RADIOLOGY

## 2025-08-19 PROCEDURE — 77063 BREAST TOMOSYNTHESIS BI: CPT | Performed by: RADIOLOGY

## 2025-08-26 ENCOUNTER — APPOINTMENT (OUTPATIENT)
Dept: ORTHOPEDIC SURGERY | Facility: CLINIC | Age: 75
End: 2025-08-26
Payer: MEDICARE

## 2025-08-26 ENCOUNTER — HOSPITAL ENCOUNTER (OUTPATIENT)
Dept: RADIOLOGY | Facility: CLINIC | Age: 75
Discharge: HOME | End: 2025-08-26
Payer: MEDICARE

## 2025-08-26 ENCOUNTER — OFFICE VISIT (OUTPATIENT)
Dept: ORTHOPEDIC SURGERY | Facility: CLINIC | Age: 75
End: 2025-08-26
Payer: MEDICARE

## 2025-08-26 VITALS — BODY MASS INDEX: 31.83 KG/M2 | HEIGHT: 62 IN | WEIGHT: 173 LBS

## 2025-08-26 DIAGNOSIS — M25.551 HIP PAIN, ACUTE, RIGHT: ICD-10-CM

## 2025-08-26 DIAGNOSIS — Z96.643 HISTORY OF BILATERAL HIP REPLACEMENTS: ICD-10-CM

## 2025-08-26 DIAGNOSIS — M25.562 CHRONIC PAIN OF LEFT KNEE: ICD-10-CM

## 2025-08-26 DIAGNOSIS — M17.12 ARTHRITIS OF LEFT KNEE: Primary | ICD-10-CM

## 2025-08-26 DIAGNOSIS — G89.29 CHRONIC PAIN OF LEFT KNEE: ICD-10-CM

## 2025-08-26 DIAGNOSIS — M25.562 ACUTE PAIN OF LEFT KNEE: ICD-10-CM

## 2025-08-26 PROCEDURE — 1159F MED LIST DOCD IN RCRD: CPT | Performed by: ORTHOPAEDIC SURGERY

## 2025-08-26 PROCEDURE — 73560 X-RAY EXAM OF KNEE 1 OR 2: CPT | Mod: LT

## 2025-08-26 PROCEDURE — 73502 X-RAY EXAM HIP UNI 2-3 VIEWS: CPT | Mod: RT

## 2025-08-26 PROCEDURE — 1036F TOBACCO NON-USER: CPT | Performed by: ORTHOPAEDIC SURGERY

## 2025-08-26 PROCEDURE — 1160F RVW MEDS BY RX/DR IN RCRD: CPT | Performed by: ORTHOPAEDIC SURGERY

## 2025-08-26 PROCEDURE — 99214 OFFICE O/P EST MOD 30 MIN: CPT | Performed by: ORTHOPAEDIC SURGERY

## 2025-08-26 PROCEDURE — 73560 X-RAY EXAM OF KNEE 1 OR 2: CPT | Mod: LEFT SIDE | Performed by: RADIOLOGY

## 2025-08-26 PROCEDURE — 73564 X-RAY EXAM KNEE 4 OR MORE: CPT | Mod: LEFT SIDE | Performed by: RADIOLOGY

## 2025-08-26 PROCEDURE — 20610 DRAIN/INJ JOINT/BURSA W/O US: CPT | Mod: LT | Performed by: ORTHOPAEDIC SURGERY

## 2025-08-26 PROCEDURE — 73560 X-RAY EXAM OF KNEE 1 OR 2: CPT | Mod: 50

## 2025-08-26 PROCEDURE — 2500000004 HC RX 250 GENERAL PHARMACY W/ HCPCS (ALT 636 FOR OP/ED): Performed by: ORTHOPAEDIC SURGERY

## 2025-08-26 PROCEDURE — 73502 X-RAY EXAM HIP UNI 2-3 VIEWS: CPT | Mod: RIGHT SIDE | Performed by: RADIOLOGY

## 2025-08-26 PROCEDURE — 99212 OFFICE O/P EST SF 10 MIN: CPT | Mod: 25 | Performed by: ORTHOPAEDIC SURGERY

## 2025-08-26 RX ORDER — LIDOCAINE HYDROCHLORIDE 10 MG/ML
2 INJECTION, SOLUTION INFILTRATION; PERINEURAL
Status: COMPLETED | OUTPATIENT
Start: 2025-08-26 | End: 2025-08-26

## 2025-08-26 RX ORDER — TRIAMCINOLONE ACETONIDE 40 MG/ML
40 INJECTION, SUSPENSION INTRA-ARTICULAR; INTRAMUSCULAR
Status: COMPLETED | OUTPATIENT
Start: 2025-08-26 | End: 2025-08-26

## 2025-08-26 RX ADMIN — LIDOCAINE HYDROCHLORIDE 2 ML: 10 INJECTION, SOLUTION INFILTRATION; PERINEURAL at 13:18

## 2025-08-26 RX ADMIN — TRIAMCINOLONE ACETONIDE 40 MG: 400 INJECTION, SUSPENSION INTRA-ARTICULAR; INTRAMUSCULAR at 13:18

## 2026-08-18 ENCOUNTER — APPOINTMENT (OUTPATIENT)
Dept: DERMATOLOGY | Facility: CLINIC | Age: 76
End: 2026-08-18
Payer: MEDICARE